# Patient Record
Sex: FEMALE | HISPANIC OR LATINO | URBAN - NONMETROPOLITAN AREA
[De-identification: names, ages, dates, MRNs, and addresses within clinical notes are randomized per-mention and may not be internally consistent; named-entity substitution may affect disease eponyms.]

---

## 2017-08-25 ENCOUNTER — FOLLOW UP ESTABLISHED (OUTPATIENT)
Dept: URBAN - NONMETROPOLITAN AREA CLINIC 7 | Facility: CLINIC | Age: 57
End: 2017-08-25
Payer: COMMERCIAL

## 2017-08-25 DIAGNOSIS — Z79.84 LONG TERM (CURRENT) USE OF ORAL ANTIDIABETIC DRUGS: ICD-10-CM

## 2017-08-25 DIAGNOSIS — Z79.899 OTHER LONG TERM (CURRENT) DRUG THERAPY: ICD-10-CM

## 2017-08-25 DIAGNOSIS — H25.13 AGE-RELATED NUCLEAR CATARACT, BILATERAL: ICD-10-CM

## 2017-08-25 PROCEDURE — 92083 EXTENDED VISUAL FIELD XM: CPT | Performed by: OPTOMETRIST

## 2017-08-25 PROCEDURE — 92134 CPTRZ OPH DX IMG PST SGM RTA: CPT | Performed by: OPTOMETRIST

## 2017-08-25 PROCEDURE — 92014 COMPRE OPH EXAM EST PT 1/>: CPT | Performed by: OPTOMETRIST

## 2017-08-25 RX ORDER — HYDROXYCHLOROQUINE SULFATE 200 MG/1
200 MG TABLET ORAL
Qty: 0 | Refills: 0 | Status: ACTIVE
Start: 2017-08-25

## 2017-08-25 ASSESSMENT — VISUAL ACUITY
OS: 20/30
OD: 20/30

## 2017-08-25 ASSESSMENT — INTRAOCULAR PRESSURE
OD: 20
OS: 20

## 2017-11-30 ENCOUNTER — FOLLOW UP ESTABLISHED (OUTPATIENT)
Dept: URBAN - NONMETROPOLITAN AREA CLINIC 7 | Facility: CLINIC | Age: 57
End: 2017-11-30
Payer: COMMERCIAL

## 2017-11-30 DIAGNOSIS — H16.223 KERATOCONJUNCT SICCA, NOT SPECIFIED AS SJOGREN'S, BILATERAL: Primary | ICD-10-CM

## 2017-11-30 PROCEDURE — 99212 OFFICE O/P EST SF 10 MIN: CPT | Performed by: OPTOMETRIST

## 2018-08-27 ENCOUNTER — FOLLOW UP ESTABLISHED (OUTPATIENT)
Dept: URBAN - NONMETROPOLITAN AREA CLINIC 7 | Facility: CLINIC | Age: 58
End: 2018-08-27
Payer: COMMERCIAL

## 2018-08-27 DIAGNOSIS — M06.9 RHEUMATOID ARTHRITIS, UNSPECIFIED: ICD-10-CM

## 2018-08-27 PROCEDURE — 92134 CPTRZ OPH DX IMG PST SGM RTA: CPT | Performed by: OPTOMETRIST

## 2018-08-27 PROCEDURE — 92014 COMPRE OPH EXAM EST PT 1/>: CPT | Performed by: OPTOMETRIST

## 2018-08-27 PROCEDURE — 92015 DETERMINE REFRACTIVE STATE: CPT | Performed by: OPTOMETRIST

## 2018-08-27 ASSESSMENT — VISUAL ACUITY
OS: 20/50
OD: 20/60

## 2018-08-27 ASSESSMENT — INTRAOCULAR PRESSURE
OD: 16
OS: 17

## 2019-04-11 ENCOUNTER — FOLLOW UP ESTABLISHED (OUTPATIENT)
Dept: URBAN - NONMETROPOLITAN AREA CLINIC 7 | Facility: CLINIC | Age: 59
End: 2019-04-11
Payer: COMMERCIAL

## 2019-04-11 PROCEDURE — 92083 EXTENDED VISUAL FIELD XM: CPT | Performed by: OPTOMETRIST

## 2019-04-11 PROCEDURE — 99212 OFFICE O/P EST SF 10 MIN: CPT | Performed by: OPTOMETRIST

## 2020-08-19 ENCOUNTER — FOLLOW UP ESTABLISHED (OUTPATIENT)
Dept: URBAN - NONMETROPOLITAN AREA CLINIC 7 | Facility: CLINIC | Age: 60
End: 2020-08-19
Payer: COMMERCIAL

## 2020-08-19 DIAGNOSIS — Z79.4 LONG TERM (CURRENT) USE OF INSULIN: ICD-10-CM

## 2020-08-19 PROCEDURE — 92014 COMPRE OPH EXAM EST PT 1/>: CPT | Performed by: OPTOMETRIST

## 2020-08-19 PROCEDURE — 92250 FUNDUS PHOTOGRAPHY W/I&R: CPT | Performed by: OPTOMETRIST

## 2020-08-19 PROCEDURE — 92015 DETERMINE REFRACTIVE STATE: CPT | Performed by: OPTOMETRIST

## 2020-08-19 PROCEDURE — 76514 ECHO EXAM OF EYE THICKNESS: CPT | Performed by: OPTOMETRIST

## 2020-08-19 ASSESSMENT — VISUAL ACUITY
OS: 20/40
OD: 20/40

## 2020-08-19 ASSESSMENT — INTRAOCULAR PRESSURE
OD: 21
OS: 21

## 2020-09-25 ENCOUNTER — FOLLOW UP ESTABLISHED (OUTPATIENT)
Dept: URBAN - NONMETROPOLITAN AREA CLINIC 7 | Facility: CLINIC | Age: 60
End: 2020-09-25
Payer: COMMERCIAL

## 2020-09-25 PROCEDURE — 92083 EXTENDED VISUAL FIELD XM: CPT | Performed by: OPTOMETRIST

## 2020-09-25 PROCEDURE — 92133 CPTRZD OPH DX IMG PST SGM ON: CPT | Performed by: OPTOMETRIST

## 2020-09-25 PROCEDURE — 99213 OFFICE O/P EST LOW 20 MIN: CPT | Performed by: OPTOMETRIST

## 2020-09-25 ASSESSMENT — INTRAOCULAR PRESSURE
OD: 20
OS: 21

## 2021-11-10 ENCOUNTER — OFFICE VISIT (OUTPATIENT)
Dept: URBAN - NONMETROPOLITAN AREA CLINIC 7 | Facility: CLINIC | Age: 61
End: 2021-11-10
Payer: COMMERCIAL

## 2021-11-10 DIAGNOSIS — H35.372 PUCKERING OF MACULA, LEFT EYE: ICD-10-CM

## 2021-11-10 DIAGNOSIS — E11.9 TYPE 2 DIABETES MELLITUS W/O COMPLICATION: Primary | ICD-10-CM

## 2021-11-10 DIAGNOSIS — H43.813 VITREOUS DEGENERATION, BILATERAL: ICD-10-CM

## 2021-11-10 DIAGNOSIS — H52.4 PRESBYOPIA: ICD-10-CM

## 2021-11-10 PROCEDURE — 92250 FUNDUS PHOTOGRAPHY W/I&R: CPT | Performed by: OPTOMETRIST

## 2021-11-10 PROCEDURE — 92014 COMPRE OPH EXAM EST PT 1/>: CPT | Performed by: OPTOMETRIST

## 2021-11-10 ASSESSMENT — VISUAL ACUITY
OS: 20/50
OD: 20/30

## 2021-11-10 ASSESSMENT — INTRAOCULAR PRESSURE
OD: 19
OS: 20

## 2021-11-10 NOTE — IMPRESSION/PLAN
Impression: Age-related nuclear cataract, bilateral: H25.13. Plan: The cataract(s) appear similar today. Surgical treatment is not currently recommended. The patient will continue to monitor vision changes and contact us if vision changes. I will continue to monitor. Patient education provided.

## 2021-11-10 NOTE — IMPRESSION/PLAN
Impression: Type 2 diabetes mellitus w/o complication: N37.0. Plan: No signs of diabetic retinopathy are currently present. Patient education provided. The importance of blood sugar control discussed. I will continue to monitor.

## 2021-11-10 NOTE — IMPRESSION/PLAN
Impression: Puckering of macula, left eye Plan: Monitor. Wic form has been faxed again and mom has been notified.

## 2021-11-10 NOTE — IMPRESSION/PLAN
Impression: Ocular hypertension, bilateral Plan: The IOP's and C/D ratios appear relatively stable today. Patient education. The patient will return for an annual Richardson visual field, optical coherence tomography and an intra-ocular pressure check. Photos ordered for future comparison for signs of progression.

## 2022-02-11 ENCOUNTER — OFFICE VISIT (OUTPATIENT)
Dept: URBAN - NONMETROPOLITAN AREA CLINIC 7 | Facility: CLINIC | Age: 62
End: 2022-02-11
Payer: COMMERCIAL

## 2022-02-11 DIAGNOSIS — H40.053 OCULAR HYPERTENSION, BILATERAL: Primary | ICD-10-CM

## 2022-02-11 PROCEDURE — 92134 CPTRZ OPH DX IMG PST SGM RTA: CPT | Performed by: OPTOMETRIST

## 2022-02-11 PROCEDURE — 99213 OFFICE O/P EST LOW 20 MIN: CPT | Performed by: OPTOMETRIST

## 2022-02-11 ASSESSMENT — INTRAOCULAR PRESSURE
OS: 19
OD: 19

## 2022-02-11 NOTE — IMPRESSION/PLAN
Impression: Other long term (current) drug therapy *Plaquenil Plan: Monitor. OCT ordered to monitor for change.

## 2023-04-03 ENCOUNTER — OFFICE VISIT (OUTPATIENT)
Dept: URBAN - NONMETROPOLITAN AREA CLINIC 7 | Facility: CLINIC | Age: 63
End: 2023-04-03
Payer: COMMERCIAL

## 2023-04-03 DIAGNOSIS — Z79.84 LONG TERM (CURRENT) USE OF ORAL HYPOGLYCEMIC DRUGS: ICD-10-CM

## 2023-04-03 DIAGNOSIS — H35.372 PUCKERING OF MACULA, LEFT EYE: ICD-10-CM

## 2023-04-03 DIAGNOSIS — Z79.899 OTHER LONG TERM (CURRENT) DRUG THERAPY: ICD-10-CM

## 2023-04-03 DIAGNOSIS — H40.053 OCULAR HYPERTENSION, BILATERAL: ICD-10-CM

## 2023-04-03 DIAGNOSIS — H25.13 AGE-RELATED NUCLEAR CATARACT, BILATERAL: ICD-10-CM

## 2023-04-03 DIAGNOSIS — H16.223 KERATOCONJUNCTIVITIS SICCA, NOT SPECIFIED AS SJOGREN'S, BILATERAL: ICD-10-CM

## 2023-04-03 DIAGNOSIS — E11.9 TYPE 2 DIABETES MELLITUS W/O COMPLICATION: Primary | ICD-10-CM

## 2023-04-03 PROCEDURE — 92250 FUNDUS PHOTOGRAPHY W/I&R: CPT | Performed by: OPTOMETRIST

## 2023-04-03 PROCEDURE — 92014 COMPRE OPH EXAM EST PT 1/>: CPT | Performed by: OPTOMETRIST

## 2023-04-03 ASSESSMENT — VISUAL ACUITY
OD: 20/30
OS: 20/40

## 2023-04-03 ASSESSMENT — INTRAOCULAR PRESSURE
OD: 20
OS: 20

## 2023-04-03 NOTE — IMPRESSION/PLAN
Impression: Keratoconjunctivitis sicca, bilateral Plan: Continue artificial tears daily. Patient education provided.

## 2023-04-03 NOTE — IMPRESSION/PLAN
Impression: Other long term (current) drug therapy *Plaquenil Plan: At present, there are no signs of Plaquenil/Hydroxychloroquine maculopathy. I will have the patient return for a Richardson visual field, and optical coherence tomography of the maculae. Patient education provided.

## 2023-04-03 NOTE — IMPRESSION/PLAN
Impression: Type 2 diabetes mellitus w/o complication: I56.0. Plan: There are no signs of diabetic retinopathy present. Patient education provided. The importance of blood sugar control discussed. I will continue to monitor.

## 2023-04-03 NOTE — IMPRESSION/PLAN
Impression: Ocular hypertension, bilateral Plan: The C/D ratios appear similar to previous findings. The intra-ocular pressures appear relatively stable today. Patient education. Return to clinic for an annual Richardson visual field, optical coherence tomography and an intra-ocular pressure check. Photos ordered for future comparison for signs of progression.

## 2023-05-02 ENCOUNTER — OFFICE VISIT (OUTPATIENT)
Dept: URBAN - NONMETROPOLITAN AREA CLINIC 7 | Facility: CLINIC | Age: 63
End: 2023-05-02
Payer: COMMERCIAL

## 2023-05-02 DIAGNOSIS — E11.36 TYPE 2 DIABETES MELLITUS WITH DIABETIC CATARACT: ICD-10-CM

## 2023-05-02 DIAGNOSIS — H40.053 OCULAR HYPERTENSION, BILATERAL: ICD-10-CM

## 2023-05-02 DIAGNOSIS — H35.372 PUCKERING OF MACULA, LEFT EYE: ICD-10-CM

## 2023-05-02 DIAGNOSIS — E11.9 TYPE 2 DIABETES MELLITUS W/O COMPLICATION: ICD-10-CM

## 2023-05-02 DIAGNOSIS — H25.813 COMBINED FORMS OF AGE-RELATED CATARACT, BILATERAL: Primary | ICD-10-CM

## 2023-05-02 PROCEDURE — 99204 OFFICE O/P NEW MOD 45 MIN: CPT | Performed by: OPHTHALMOLOGY

## 2023-05-02 ASSESSMENT — KERATOMETRY
OD: 42.34
OS: 42.48

## 2023-05-02 ASSESSMENT — INTRAOCULAR PRESSURE
OS: 19
OD: 19

## 2023-05-02 NOTE — IMPRESSION/PLAN
Impression: Type 2 diabetes mellitus w/o complication: Y44.9. Plan: Diabetes type II: no background retinopathy, no signs of neovascularization noted. Discussed ocular and systemic benefits of blood sugar control.

## 2023-05-02 NOTE — IMPRESSION/PLAN
Impression: Combined forms of age-related cataract, bilateral: H25.813. Plan: Cataract accounts for pt complaints. Pt desires sx. Schedule CE/IOL both eyes, left then right. Risk/Benefits/Alternatives discussed with patient. Rec. mono-focal/Toric/Vivity. Consider ORA/LRI. RL2. Target near. Order a-scan. Patient is a candidate for Mikayla Elmore. Discussed R/B/A. Recommend Ofloxacin or Tobramycin QID for 1 week postop. Intra-cameral Moxifloxacin to decrease the risk of infection. May elect to use combination drops or generics per patient preference.

## 2023-05-15 ENCOUNTER — TESTING ONLY (OUTPATIENT)
Dept: URBAN - NONMETROPOLITAN AREA CLINIC 7 | Facility: CLINIC | Age: 63
End: 2023-05-15
Payer: COMMERCIAL

## 2023-05-15 DIAGNOSIS — H25.813 COMBINED FORMS OF AGE-RELATED CATARACT, BILATERAL: Primary | ICD-10-CM

## 2023-05-15 PROCEDURE — 92025 CPTRIZED CORNEAL TOPOGRAPHY: CPT | Performed by: OPHTHALMOLOGY

## 2023-05-15 RX ORDER — OFLOXACIN 3 MG/ML
0.3 % SOLUTION/ DROPS OPHTHALMIC
Qty: 5 | Refills: 1 | Status: ACTIVE
Start: 2023-05-15

## 2023-05-22 ENCOUNTER — PROCEDURE (OUTPATIENT)
Dept: URBAN - METROPOLITAN AREA SURGERY 37 | Facility: SURGERY | Age: 63
End: 2023-05-22
Payer: COMMERCIAL

## 2023-05-22 DIAGNOSIS — H25.813 COMBINED FORMS OF AGE-RELATED CATARACT, BILATERAL: ICD-10-CM

## 2023-05-22 DIAGNOSIS — H52.223 REGULAR ASTIGMATISM, BILATERAL: Primary | ICD-10-CM

## 2023-05-22 PROCEDURE — PR4CP PR4CP: CUSTOM | Performed by: OPHTHALMOLOGY

## 2023-05-22 PROCEDURE — 66984 XCAPSL CTRC RMVL W/O ECP: CPT | Performed by: OPHTHALMOLOGY

## 2023-05-23 ENCOUNTER — POST-OPERATIVE VISIT (OUTPATIENT)
Dept: URBAN - NONMETROPOLITAN AREA CLINIC 7 | Facility: CLINIC | Age: 63
End: 2023-05-23
Payer: COMMERCIAL

## 2023-05-23 DIAGNOSIS — Z48.810 ENCOUNTER FOR SURGICAL AFTERCARE FOLLOWING SURGERY ON A SENSE ORGAN: Primary | ICD-10-CM

## 2023-05-23 PROCEDURE — 99024 POSTOP FOLLOW-UP VISIT: CPT | Performed by: OPTOMETRIST

## 2023-05-23 ASSESSMENT — INTRAOCULAR PRESSURE: OS: 25

## 2023-05-23 NOTE — IMPRESSION/PLAN
Impression: S/P Cataract Extraction by phacoemulsification with IOL placement; ORA; LRI (Limbal Relaxing Incision) OS - 1 Day. Encounter for surgical aftercare following surgery on a sense organ  Z48.810. Plan: Stay away from pools/hot tubs. No heavy lifting. No driving. Use patch at night x 1 week. Continue medications as directed.

## 2023-05-31 ENCOUNTER — POST-OPERATIVE VISIT (OUTPATIENT)
Dept: URBAN - NONMETROPOLITAN AREA CLINIC 7 | Facility: CLINIC | Age: 63
End: 2023-05-31
Payer: COMMERCIAL

## 2023-05-31 DIAGNOSIS — H52.4 PRESBYOPIA: Primary | ICD-10-CM

## 2023-05-31 DIAGNOSIS — Z48.810 ENCOUNTER FOR SURGICAL AFTERCARE FOLLOWING SURGERY ON A SENSE ORGAN: ICD-10-CM

## 2023-05-31 PROCEDURE — 99024 POSTOP FOLLOW-UP VISIT: CPT | Performed by: OPTOMETRIST

## 2023-05-31 PROCEDURE — 92015 DETERMINE REFRACTIVE STATE: CPT | Performed by: OPTOMETRIST

## 2023-05-31 ASSESSMENT — INTRAOCULAR PRESSURE
OD: 21
OS: 21

## 2023-05-31 ASSESSMENT — VISUAL ACUITY
OS: 20/30
OD: 20/30

## 2023-06-14 ENCOUNTER — PROCEDURE (OUTPATIENT)
Dept: URBAN - METROPOLITAN AREA SURGERY 37 | Facility: SURGERY | Age: 63
End: 2023-06-14
Payer: COMMERCIAL

## 2023-06-14 DIAGNOSIS — H25.811 COMBINED FORMS OF AGE-RELATED CATARACT, RIGHT EYE: ICD-10-CM

## 2023-06-14 DIAGNOSIS — H52.223 REGULAR ASTIGMATISM, BILATERAL: Primary | ICD-10-CM

## 2023-06-14 PROCEDURE — 66984 XCAPSL CTRC RMVL W/O ECP: CPT | Performed by: OPHTHALMOLOGY

## 2023-06-14 PROCEDURE — PR4CP PR4CP: CUSTOM | Performed by: OPHTHALMOLOGY

## 2023-06-14 RX ORDER — DICLOFENAC SODIUM 1 MG/ML
0.1 % SOLUTION/ DROPS OPHTHALMIC
Qty: 5 | Refills: 1 | Status: ACTIVE
Start: 2023-06-14

## 2023-06-15 ENCOUNTER — POST-OPERATIVE VISIT (OUTPATIENT)
Dept: URBAN - NONMETROPOLITAN AREA CLINIC 7 | Facility: CLINIC | Age: 63
End: 2023-06-15
Payer: COMMERCIAL

## 2023-06-15 DIAGNOSIS — Z96.1 PRESENCE OF INTRAOCULAR LENS: Primary | ICD-10-CM

## 2023-06-15 PROCEDURE — 99024 POSTOP FOLLOW-UP VISIT: CPT | Performed by: OPTOMETRIST

## 2023-06-15 ASSESSMENT — INTRAOCULAR PRESSURE: OD: 19

## 2023-06-15 NOTE — IMPRESSION/PLAN
Impression: S/P CE/Standard IOL with LRI OD - 1 Day. Presence of intraocular lens  Z96.1. Plan: Stay away from pools/hot tubs. No heavy lifting. No driving. Use patch at night x 1 week. Continue medications as directed.

## 2023-06-22 ENCOUNTER — POST-OPERATIVE VISIT (OUTPATIENT)
Dept: URBAN - NONMETROPOLITAN AREA CLINIC 7 | Facility: CLINIC | Age: 63
End: 2023-06-22
Payer: COMMERCIAL

## 2023-06-22 DIAGNOSIS — H52.4 PRESBYOPIA: Primary | ICD-10-CM

## 2023-06-22 DIAGNOSIS — H35.372 PUCKERING OF MACULA, LEFT EYE: ICD-10-CM

## 2023-06-22 PROCEDURE — 92134 CPTRZ OPH DX IMG PST SGM RTA: CPT | Performed by: OPTOMETRIST

## 2023-06-22 PROCEDURE — 99024 POSTOP FOLLOW-UP VISIT: CPT | Performed by: OPTOMETRIST

## 2023-06-22 ASSESSMENT — VISUAL ACUITY
OS: 20/60
OD: 20/60

## 2023-06-22 ASSESSMENT — INTRAOCULAR PRESSURE
OD: 19
OS: 19

## 2023-07-05 ENCOUNTER — OFFICE VISIT (OUTPATIENT)
Dept: URBAN - NONMETROPOLITAN AREA CLINIC 7 | Facility: CLINIC | Age: 63
End: 2023-07-05
Payer: COMMERCIAL

## 2023-07-05 DIAGNOSIS — H16.223 KERATOCONJUNCTIVITIS SICCA, NOT SPECIFIED AS SJOGREN'S, BILATERAL: ICD-10-CM

## 2023-07-05 DIAGNOSIS — H52.4 PRESBYOPIA: ICD-10-CM

## 2023-07-05 DIAGNOSIS — H40.053 OCULAR HYPERTENSION, BILATERAL: Primary | ICD-10-CM

## 2023-07-05 PROCEDURE — 92083 EXTENDED VISUAL FIELD XM: CPT | Performed by: OPTOMETRIST

## 2023-07-05 PROCEDURE — 99213 OFFICE O/P EST LOW 20 MIN: CPT | Performed by: OPTOMETRIST

## 2023-07-05 PROCEDURE — 92133 CPTRZD OPH DX IMG PST SGM ON: CPT | Performed by: OPTOMETRIST

## 2023-07-05 ASSESSMENT — INTRAOCULAR PRESSURE
OS: 18
OD: 18

## 2023-07-05 ASSESSMENT — VISUAL ACUITY
OS: 20/30
OD: 20/30

## 2023-07-05 NOTE — IMPRESSION/PLAN
Impression: Ocular hypertension, bilateral Plan: VF's and OCT's are relativity stable. Continue to monitor. Pt ed.

## 2023-12-11 ENCOUNTER — OFFICE VISIT (OUTPATIENT)
Dept: URBAN - NONMETROPOLITAN AREA CLINIC 7 | Facility: CLINIC | Age: 63
End: 2023-12-11
Payer: COMMERCIAL

## 2023-12-11 DIAGNOSIS — H35.372 PUCKERING OF MACULA, LEFT EYE: ICD-10-CM

## 2023-12-11 DIAGNOSIS — Z96.1 PRESENCE OF INTRAOCULAR LENS: ICD-10-CM

## 2023-12-11 DIAGNOSIS — H40.013 OPEN ANGLE WITH BORDERLINE FINDINGS, LOW RISK, BILATERAL: Primary | ICD-10-CM

## 2023-12-11 DIAGNOSIS — Z79.899 OTHER LONG TERM (CURRENT) DRUG THERAPY: ICD-10-CM

## 2023-12-11 DIAGNOSIS — H16.223 KERATOCONJUNCTIVITIS SICCA, NOT SPECIFIED AS SJOGREN'S, BILATERAL: ICD-10-CM

## 2023-12-11 PROCEDURE — 92134 CPTRZ OPH DX IMG PST SGM RTA: CPT | Performed by: OPTOMETRIST

## 2023-12-11 PROCEDURE — 99213 OFFICE O/P EST LOW 20 MIN: CPT | Performed by: OPTOMETRIST

## 2023-12-11 ASSESSMENT — INTRAOCULAR PRESSURE
OD: 16
OS: 15

## 2024-02-12 NOTE — IMPRESSION/PLAN
Impression: Vitreous degeneration, bilateral Plan: Education provided regarding symptoms and risks of retinal holes, tears, and detachment. The patient was instructed to contact office immediately should symptoms occur. Continue to monitor. home no

## 2024-04-08 ENCOUNTER — OFFICE VISIT (OUTPATIENT)
Dept: URBAN - NONMETROPOLITAN AREA CLINIC 7 | Facility: CLINIC | Age: 64
End: 2024-04-08
Payer: COMMERCIAL

## 2024-04-08 DIAGNOSIS — H16.223 KERATOCONJUNCTIVITIS SICCA, NOT SPECIFIED AS SJOGREN'S, BILATERAL: ICD-10-CM

## 2024-04-08 DIAGNOSIS — Z79.899 OTHER LONG TERM (CURRENT) DRUG THERAPY: ICD-10-CM

## 2024-04-08 DIAGNOSIS — E11.9 TYPE 2 DIABETES MELLITUS WITHOUT COMPLICATIONS: ICD-10-CM

## 2024-04-08 DIAGNOSIS — H40.013 OPEN ANGLE WITH BORDERLINE FINDINGS, LOW RISK, BILATERAL: Primary | ICD-10-CM

## 2024-04-08 DIAGNOSIS — Z79.84 LONG TERM (CURRENT) USE OF ORAL HYPOGLYCEMIC DRUGS: ICD-10-CM

## 2024-04-08 DIAGNOSIS — H26.493 OTHER SECONDARY CATARACT, BILATERAL: ICD-10-CM

## 2024-04-08 DIAGNOSIS — H43.813 VITREOUS DEGENERATION, BILATERAL: ICD-10-CM

## 2024-04-08 DIAGNOSIS — Z96.1 PRESENCE OF INTRAOCULAR LENS: ICD-10-CM

## 2024-04-08 PROCEDURE — 92014 COMPRE OPH EXAM EST PT 1/>: CPT | Performed by: OPTOMETRIST

## 2024-04-08 PROCEDURE — 92250 FUNDUS PHOTOGRAPHY W/I&R: CPT | Performed by: OPTOMETRIST

## 2024-04-08 ASSESSMENT — VISUAL ACUITY
OS: 20/40
OD: 20/30

## 2024-04-08 ASSESSMENT — INTRAOCULAR PRESSURE
OS: 14
OD: 14

## 2024-08-06 ENCOUNTER — OFFICE VISIT (OUTPATIENT)
Dept: URBAN - NONMETROPOLITAN AREA CLINIC 7 | Facility: CLINIC | Age: 64
End: 2024-08-06
Payer: COMMERCIAL

## 2024-08-06 DIAGNOSIS — H16.223 KERATOCONJUNCTIVITIS SICCA, NOT SPECIFIED AS SJOGREN'S, BILATERAL: ICD-10-CM

## 2024-08-06 DIAGNOSIS — Z79.899 OTHER LONG TERM (CURRENT) DRUG THERAPY: ICD-10-CM

## 2024-08-06 DIAGNOSIS — H40.013 OPEN ANGLE WITH BORDERLINE FINDINGS, LOW RISK, BILATERAL: Primary | ICD-10-CM

## 2024-08-06 DIAGNOSIS — H26.493 OTHER SECONDARY CATARACT, BILATERAL: ICD-10-CM

## 2024-08-06 DIAGNOSIS — Z96.1 PRESENCE OF INTRAOCULAR LENS: ICD-10-CM

## 2024-08-06 PROCEDURE — 99213 OFFICE O/P EST LOW 20 MIN: CPT | Performed by: OPTOMETRIST

## 2024-08-06 PROCEDURE — 92134 CPTRZ OPH DX IMG PST SGM RTA: CPT | Performed by: OPTOMETRIST

## 2024-08-06 ASSESSMENT — INTRAOCULAR PRESSURE
OS: 13
OD: 13

## 2024-12-04 ENCOUNTER — OFFICE VISIT (OUTPATIENT)
Dept: URBAN - NONMETROPOLITAN AREA CLINIC 7 | Facility: CLINIC | Age: 64
End: 2024-12-04
Payer: COMMERCIAL

## 2024-12-04 DIAGNOSIS — H40.013 OPEN ANGLE WITH BORDERLINE FINDINGS, LOW RISK, BILATERAL: Primary | ICD-10-CM

## 2024-12-04 DIAGNOSIS — H16.223 KERATOCONJUNCTIVITIS SICCA, NOT SPECIFIED AS SJOGREN'S, BILATERAL: ICD-10-CM

## 2024-12-04 PROCEDURE — 99213 OFFICE O/P EST LOW 20 MIN: CPT | Performed by: OPTOMETRIST

## 2024-12-04 PROCEDURE — 92133 CPTRZD OPH DX IMG PST SGM ON: CPT | Performed by: OPTOMETRIST

## 2024-12-04 PROCEDURE — 92083 EXTENDED VISUAL FIELD XM: CPT | Performed by: OPTOMETRIST

## 2024-12-04 ASSESSMENT — INTRAOCULAR PRESSURE
OS: 18
OD: 16

## 2025-04-04 ENCOUNTER — OFFICE VISIT (OUTPATIENT)
Dept: URBAN - NONMETROPOLITAN AREA CLINIC 7 | Facility: CLINIC | Age: 65
End: 2025-04-04
Payer: COMMERCIAL

## 2025-04-04 DIAGNOSIS — H43.813 VITREOUS DEGENERATION, BILATERAL: ICD-10-CM

## 2025-04-04 DIAGNOSIS — H26.493 OTHER SECONDARY CATARACT, BILATERAL: ICD-10-CM

## 2025-04-04 DIAGNOSIS — Z79.899 OTHER LONG TERM (CURRENT) DRUG THERAPY: ICD-10-CM

## 2025-04-04 DIAGNOSIS — Z96.1 PRESENCE OF INTRAOCULAR LENS: ICD-10-CM

## 2025-04-04 DIAGNOSIS — E11.9 TYPE 2 DIABETES MELLITUS WITHOUT COMPLICATIONS: ICD-10-CM

## 2025-04-04 DIAGNOSIS — H40.013 OPEN ANGLE WITH BORDERLINE FINDINGS, LOW RISK, BILATERAL: Primary | ICD-10-CM

## 2025-04-04 DIAGNOSIS — H35.372 PUCKERING OF MACULA, LEFT EYE: ICD-10-CM

## 2025-04-04 DIAGNOSIS — H16.223 KERATOCONJUNCTIVITIS SICCA, NOT SPECIFIED AS SJOGREN'S, BILATERAL: ICD-10-CM

## 2025-04-04 DIAGNOSIS — Z79.84 LONG TERM (CURRENT) USE OF ORAL HYPOGLYCEMIC DRUGS: ICD-10-CM

## 2025-04-04 PROCEDURE — 92250 FUNDUS PHOTOGRAPHY W/I&R: CPT | Performed by: OPTOMETRIST

## 2025-04-04 PROCEDURE — 92014 COMPRE OPH EXAM EST PT 1/>: CPT | Performed by: OPTOMETRIST

## 2025-04-04 RX ORDER — LIFITEGRAST 50 MG/ML
5 % SOLUTION/ DROPS OPHTHALMIC
Qty: 90 | Refills: 3 | Status: ACTIVE
Start: 2025-04-04

## 2025-04-04 ASSESSMENT — INTRAOCULAR PRESSURE
OS: 18
OD: 18

## 2025-05-16 ENCOUNTER — OFFICE VISIT (OUTPATIENT)
Dept: URBAN - NONMETROPOLITAN AREA CLINIC 7 | Facility: CLINIC | Age: 65
End: 2025-05-16
Payer: COMMERCIAL

## 2025-05-16 DIAGNOSIS — H16.223 KERATOCONJUNCTIVITIS SICCA, NOT SPECIFIED AS SJOGREN'S, BILATERAL: Primary | ICD-10-CM

## 2025-05-16 PROCEDURE — 99213 OFFICE O/P EST LOW 20 MIN: CPT | Performed by: OPTOMETRIST

## 2025-06-18 ENCOUNTER — OFFICE VISIT (OUTPATIENT)
Dept: URBAN - NONMETROPOLITAN AREA CLINIC 7 | Facility: CLINIC | Age: 65
End: 2025-06-18
Payer: COMMERCIAL

## 2025-06-18 DIAGNOSIS — H16.223 KERATOCONJUNCTIVITIS SICCA, NOT SPECIFIED AS SJOGREN'S, BILATERAL: Primary | ICD-10-CM

## 2025-06-18 PROCEDURE — 99213 OFFICE O/P EST LOW 20 MIN: CPT | Performed by: OPTOMETRIST

## 2025-08-06 ENCOUNTER — OFFICE VISIT (OUTPATIENT)
Dept: URBAN - NONMETROPOLITAN AREA CLINIC 7 | Facility: CLINIC | Age: 65
End: 2025-08-06
Payer: COMMERCIAL

## 2025-08-06 DIAGNOSIS — Z79.899 OTHER LONG TERM (CURRENT) DRUG THERAPY: Primary | ICD-10-CM

## 2025-08-06 DIAGNOSIS — H16.223 KERATOCONJUNCTIVITIS SICCA, NOT SPECIFIED AS SJOGREN'S, BILATERAL: ICD-10-CM

## 2025-08-06 PROCEDURE — 92083 EXTENDED VISUAL FIELD XM: CPT | Performed by: OPTOMETRIST

## 2025-08-06 PROCEDURE — 92134 CPTRZ OPH DX IMG PST SGM RTA: CPT | Performed by: OPTOMETRIST

## 2025-08-06 PROCEDURE — 99213 OFFICE O/P EST LOW 20 MIN: CPT | Performed by: OPTOMETRIST

## 2025-08-06 ASSESSMENT — INTRAOCULAR PRESSURE
OS: 16
OD: 16